# Patient Record
Sex: MALE | Race: AMERICAN INDIAN OR ALASKA NATIVE
[De-identification: names, ages, dates, MRNs, and addresses within clinical notes are randomized per-mention and may not be internally consistent; named-entity substitution may affect disease eponyms.]

---

## 2018-04-25 ENCOUNTER — HOSPITAL ENCOUNTER (EMERGENCY)
Dept: HOSPITAL 5 - ED | Age: 23
Discharge: HOME | End: 2018-04-25
Payer: COMMERCIAL

## 2018-04-25 VITALS — DIASTOLIC BLOOD PRESSURE: 66 MMHG | SYSTOLIC BLOOD PRESSURE: 119 MMHG

## 2018-04-25 DIAGNOSIS — F12.10: ICD-10-CM

## 2018-04-25 DIAGNOSIS — J45.31: Primary | ICD-10-CM

## 2018-04-25 DIAGNOSIS — F17.200: ICD-10-CM

## 2018-04-25 DIAGNOSIS — L20.84: ICD-10-CM

## 2018-04-25 PROCEDURE — 94644 CONT INHLJ TX 1ST HOUR: CPT

## 2018-04-25 NOTE — EMERGENCY DEPARTMENT REPORT
Blank Doc





- Documentation


Documentation: 





 is a 22-year-old  male who is presenting with asthma like 

symptoms for the past 2 days.  Patient ran out of his nebulizer medications.  

Patient does have seasonal allergy type symptoms.  Patient presenting with 

wheezing shortness of breath.


Patient has diffuse wheeze but is able to speak in full sentences.  Patie will 

be moved to a treatment room to get a nebulizer treatment.  Nt

## 2018-04-25 NOTE — EMERGENCY DEPARTMENT REPORT
HPI





- General


Chief Complaint: Adult Asthma


Time Seen by Provider: 04/25/18 13:01





- HPI


HPI: 





 Pt  is a 22-year-old  male who is presenting with asthma like 

symptoms for the past 2 days.  Patient ran out of his nebulizer medications.  

Patient does have seasonal allergy type symptoms.  Patient presenting with 

wheezing shortness of breath.  He denies any chest pain but reports tightness 

when he breathes in and this is similar with past asthma attacks..  He said he 

ran out of his albuterol nebulizer and also inhaler and would like refill.  

Denies any fever or chills.  Denies any nasal congestion or runny nose.  He 

said he had 1 ER visit over the last year without any issue of intubation.  

Chest tightness is 9 out of 10, feels tight and associated with breathing .  

She also reported that he has eczema flare up to his arms and would like to 

have eczema cream that does not have steroid and it because he said steroids 

make his skin light and thin.








ED Past Medical Hx





- Past Medical History


Previous Medical History?: Yes


Hx Asthma: Yes





- Surgical History


Past Surgical History?: No





- Family History


Family history: no significant





- Social History


Smoking Status: Current Every Day Smoker


Substance Use Type: Alcohol, Marijuana, Prescribed





- Medications


Home Medications: 


 Home Medications











 Medication  Instructions  Recorded  Confirmed  Last Taken  Type


 


ALBUTEROL Inhaler [ProAir HFA 2 puff IH Q4-6H PRN 1 Days #1 04/25/18  Unknown Rx





Inhaler] inhalation    


 


ALBUTEROL NEB's [Proventil 0.083% 2.5 mg IH Q6H PRN #1 pack 04/25/18  Unknown Rx





NEBS]     


 


Dimethicone/Colloidal Oatmeal 71 gm TP Q8H PRN #1 lotion 04/25/18  Unknown Rx





[Aveeno Daily Moisturizing Lot]     


 


Tacrolimus 30 gm TP BID #1 oint...g. 04/25/18  Unknown Rx


 


predniSONE [Deltasone] 20 mg PO QDAY 5 Days #5 tab 04/25/18  Unknown Rx














ED Review of Systems


ROS: 


Stated complaint: DOROTHY AFTER SMOKING


Other details as noted in HPI





Comment: All other systems reviewed and negative


Constitutional: no symptoms reported


ENT: denies: ear pain, throat pain, congestion


Respiratory: cough, shortness of breath, wheezing.  denies: orthopnea, SOB with 

exertion, SOB at rest, stridor


Cardiovascular: denies: chest pain, palpitations, edema, syncope


Gastrointestinal: denies: abdominal pain, nausea, vomiting, diarrhea, 

constipation


Musculoskeletal: denies: back pain, joint swelling, arthralgia, myalgia


Skin: denies: rash


Neurological: denies: headache, numbness, paresthesias, confusion, abnormal gait

, vertigo





Physical Exam





- Physical Exam


Vital Signs: 


 Vital Signs











  04/25/18





  12:53


 


Temperature 98.8 F


 


Pulse Rate 75


 


Respiratory 22





Rate 


 


Blood Pressure 119/66


 


O2 Sat by Pulse 95





Oximetry 











General: 





This 22-year-old male well-nourished well-developed that appears to be in mild 

distress from asthma exacerbation.  He is nontoxic in appearance


Physical Exam: 





Head: Normocephalic atraumatic


Ears:BIateral TM congested without erythema and loss of bony landmarks.  Macho 

EAC with normal exam.  No mastoid bone tenderness.


Mouth: Moist, no pharyngeal erythema or exudate .   No tonsillar erythema or 

exudate.  UVULA midline and oral airways patent. No peritonsillar abscess


Neck: Nontender to palpate, supple, normal range of motion. No adenopathy. No c-

spine tenderness.  


 Nose: Bilateral nasal mucosa congested with clear drainage.  Maxillary and 

frontal sinuses non-tender to palpate. 


 Eyes: Bilateral Sclerae and  conjunctiva without injection.  Bilateral pupils 

equal and reactive to light.  Bilateral lids are normal.    Normal 

accommodation.BEOMI


Lungs: Auscultated wheezing throughout lung fields.  No rhonchi or rale.  

Patient with dry cough . Mild increased work of breathing.No chest wall 

tenderness or crepitus


Extremity: No clubbing, cyanosis or edema.  Positive pulses all extremities and 

no neurovascular compromise


Abdomen: Soft, nontender to palpate in all quadrants and normal bowel sounds in 

all quadrants.


CV: S1, S2.  Regular rate and rhythm negative murmur.  Capillary refill is less 

than 3 seconds


Skin: Noted dry, scaly area to antecubital fossa appears to be eczema flare.  

No signs of infection


Psych: Normal mood and behavior 





ED Course


 Vital Signs











  04/25/18





  12:53


 


Temperature 98.8 F


 


Pulse Rate 75


 


Respiratory 22





Rate 


 


Blood Pressure 119/66


 


O2 Sat by Pulse 95





Oximetry 














- Reevaluation(s)


Reevaluation #1: 





04/25/18 15:09


Patient received albuterol 10 mg nebulizer, Atrovent 1 mg nebulizer and 

Deltasone 60 mg by mouth and upon reevaluation of lungs, patient lung sounds 

better with minimal wheeze in to upper lung fields.  He was refusing prednisone 

at first but decided to take.





ED Medical Decision Making





- Medical Decision Making





ED course: Pt with asthma flare and complains of eczema.  He was given 

albuterol 10 mg nebulizer and Atrovent 1 mg nebulizer.  He was also given 

prednisone 60 g by mouth which will help his eczema and also asthma flare.  

Upon reevaluation of lungs, patient lung sounds are better and he said he feels 

better.  Patient is requesting a cream for eczema that does not have steroid 

and it.  He is also refusing prescription for prednisone.  I discussed with him 

that he will need to take prednisone for asthma flare and he was very wheezy 

upon arrival to the emergency room so he accepted proximal prescription and 

also prescription for albuterol nebulizer and inhaler.  Patient does have 

access to primary care but he does not have primary care physician at this time 

so I will refer him to  [facility] and Dr. Erinne who is on-call 

for primary care.  Discharged from hospital in stable condition to follow up 

with primary care.


Critical care attestation.: 


If time is entered above; I have spent that time in minutes in the direct care 

of this critically ill patient, excluding procedure time.








ED Disposition


Clinical Impression: 


Eczema


Qualifiers:


 Eczema type: intrinsic Qualified Code(s): L20.84 - Intrinsic (allergic) eczema





Asthma attack


Qualifiers:


 Asthma severity: mild Asthma persistence: persistent Qualified Code(s): J45.31 

- Mild persistent asthma with (acute) exacerbation





Disposition: DC-01 TO HOME OR SELFCARE


Is pt being admited?: No


Does the pt Need Aspirin: No


Condition: Stable


Instructions:  Asthma (ED), Eczema (ED)


Additional Instructions: 


Please see medication as prescribed


Please see primary care referrals and discharge instruction paperwork


Prescriptions: 


ALBUTEROL Inhaler [ProAir HFA Inhaler] 2 puff IH Q4-6H PRN 1 Days #1 inhalation


 PRN Reason: cough/wheeezing


ALBUTEROL NEB's [Proventil 0.083% NEBS] 2.5 mg IH Q6H PRN #1 pack


 PRN Reason: cough and wheezing


Dimethicone/Colloidal Oatmeal [Aveeno Daily Moisturizing Lot] 71 gm TP Q8H PRN #

1 lotion


 PRN Reason: eczema


predniSONE [Deltasone] 20 mg PO QDAY 5 Days #5 tab


Tacrolimus 30 gm TP BID #1 oint...g.


Referrals: 


[facility] [Outside] - 04/27/18


ERINNE,SAMUEL C, MD [Staff Physician] - 04/27/18


LEO CLEVELAND MD [Staff Physician] - 04/30/18


Forms:  Work/School Release Form(ED)

## 2022-02-22 ENCOUNTER — WEB ENCOUNTER (OUTPATIENT)
Dept: URBAN - METROPOLITAN AREA CLINIC 17 | Facility: CLINIC | Age: 27
End: 2022-02-22

## 2022-02-22 ENCOUNTER — OFFICE VISIT (OUTPATIENT)
Dept: URBAN - METROPOLITAN AREA CLINIC 17 | Facility: CLINIC | Age: 27
End: 2022-02-22
Payer: COMMERCIAL

## 2022-02-22 ENCOUNTER — LAB OUTSIDE AN ENCOUNTER (OUTPATIENT)
Dept: URBAN - METROPOLITAN AREA CLINIC 17 | Facility: CLINIC | Age: 27
End: 2022-02-22

## 2022-02-22 DIAGNOSIS — E66.01 MORBID OBESITY: ICD-10-CM

## 2022-02-22 DIAGNOSIS — J45.20 MILD INTERMITTENT CHILDHOOD ASTHMA WITHOUT COMPLICATION: ICD-10-CM

## 2022-02-22 DIAGNOSIS — K59.09 CHRONIC CONSTIPATION: ICD-10-CM

## 2022-02-22 DIAGNOSIS — R10.33 PERIUMBILICAL ABDOMINAL PAIN: ICD-10-CM

## 2022-02-22 PROBLEM — 238136002: Status: ACTIVE | Noted: 2022-02-22

## 2022-02-22 PROCEDURE — 99204 OFFICE O/P NEW MOD 45 MIN: CPT | Performed by: INTERNAL MEDICINE

## 2022-02-22 NOTE — HPI-TODAY'S VISIT:
2/[birth year]/22 25 yo male who is self referred. No PCP. He is  with 3 children.  c/o epigastric/periumbilical pain for at least a year. There is a baseline almost constant discomfort with severe exacerbations. Baseline pain is mild. Severe exacerbations feel like "a knife in the stomach" No clear precipitants.  Usually worse in the am about 11 am. Lasts about 10 mins. No clear reason for resolution.  No nausea or vomiting.  Does not use nsaids or only rarely.  BMs are irregular. For the past year. He became vegan 2 months ago and this helped but then he stopped being vegan and BMs went back to baseline. No blood in stool.  He has a BM about 1-2 times a week. Has not tried any laxatives.

## 2022-02-23 LAB
A/G RATIO: 1.8
ALBUMIN: 4.6
ALKALINE PHOSPHATASE: 51
ALT (SGPT): 32
AST (SGOT): 28
BILIRUBIN, TOTAL: 0.3
BUN/CREATININE RATIO: 12
BUN: 11
CALCIUM: 9.4
CARBON DIOXIDE, TOTAL: 22
CHLORIDE: 103
CREATININE: 0.91
EGFR IF AFRICN AM: 135
EGFR IF NONAFRICN AM: 117
GLOBULIN, TOTAL: 2.5
GLUCOSE: 97
HEMATOCRIT: 48.9
HEMOGLOBIN: 16.3
MCH: 28.1
MCHC: 33.3
MCV: 84
NRBC: (no result)
PLATELETS: 287
POTASSIUM: 4
PROTEIN, TOTAL: 7.1
RBC: 5.81
RDW: 13.5
SODIUM: 140
WBC: 4.7

## 2022-02-28 ENCOUNTER — OFFICE VISIT (OUTPATIENT)
Dept: URBAN - METROPOLITAN AREA SURGERY CENTER 16 | Facility: SURGERY CENTER | Age: 27
End: 2022-02-28

## 2023-04-27 ENCOUNTER — TELEPHONE ENCOUNTER (OUTPATIENT)
Dept: URBAN - METROPOLITAN AREA CLINIC 17 | Facility: CLINIC | Age: 28
End: 2023-04-27

## 2023-07-17 ENCOUNTER — OFFICE VISIT (OUTPATIENT)
Dept: URBAN - METROPOLITAN AREA CLINIC 17 | Facility: CLINIC | Age: 28
End: 2023-07-17

## 2023-07-27 ENCOUNTER — OFFICE VISIT (OUTPATIENT)
Dept: URBAN - METROPOLITAN AREA CLINIC 17 | Facility: CLINIC | Age: 28
End: 2023-07-27

## 2023-10-05 ENCOUNTER — OFFICE VISIT (OUTPATIENT)
Dept: URBAN - METROPOLITAN AREA CLINIC 17 | Facility: CLINIC | Age: 28
End: 2023-10-05

## 2023-10-23 ENCOUNTER — OFFICE VISIT (OUTPATIENT)
Dept: URBAN - METROPOLITAN AREA CLINIC 17 | Facility: CLINIC | Age: 28
End: 2023-10-23
Payer: COMMERCIAL

## 2023-10-23 ENCOUNTER — LAB OUTSIDE AN ENCOUNTER (OUTPATIENT)
Dept: URBAN - METROPOLITAN AREA CLINIC 17 | Facility: CLINIC | Age: 28
End: 2023-10-23

## 2023-10-23 VITALS
BODY MASS INDEX: 44.1 KG/M2 | WEIGHT: 315 LBS | DIASTOLIC BLOOD PRESSURE: 77 MMHG | SYSTOLIC BLOOD PRESSURE: 114 MMHG | HEART RATE: 76 BPM | TEMPERATURE: 97.6 F | HEIGHT: 71 IN

## 2023-10-23 DIAGNOSIS — J45.20 MILD INTERMITTENT CHILDHOOD ASTHMA WITHOUT COMPLICATION: ICD-10-CM

## 2023-10-23 DIAGNOSIS — R10.33 PERIUMBILICAL ABDOMINAL PAIN: ICD-10-CM

## 2023-10-23 DIAGNOSIS — E66.01 MORBID OBESITY: ICD-10-CM

## 2023-10-23 DIAGNOSIS — K59.09 CHRONIC CONSTIPATION: ICD-10-CM

## 2023-10-23 PROCEDURE — 99214 OFFICE O/P EST MOD 30 MIN: CPT | Performed by: INTERNAL MEDICINE
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
## 2023-11-01 ENCOUNTER — OFFICE VISIT (OUTPATIENT)
Dept: URBAN - METROPOLITAN AREA SURGERY CENTER 16 | Facility: SURGERY CENTER | Age: 28
End: 2023-11-01

## 2024-01-17 ENCOUNTER — TELEPHONE ENCOUNTER (OUTPATIENT)
Dept: URBAN - METROPOLITAN AREA CLINIC 105 | Facility: CLINIC | Age: 29
End: 2024-01-17

## 2024-01-17 RX ORDER — PANTOPRAZOLE SODIUM 40 MG/1
1 TABLET TABLET, DELAYED RELEASE ORAL ONCE A DAY
Qty: 90 TABLET | Refills: 0 | OUTPATIENT
Start: 2024-01-17

## 2024-01-18 ENCOUNTER — OFFICE VISIT (OUTPATIENT)
Dept: URBAN - METROPOLITAN AREA SURGERY CENTER 16 | Facility: SURGERY CENTER | Age: 29
End: 2024-01-18

## 2024-02-29 ENCOUNTER — OV EP (OUTPATIENT)
Dept: URBAN - METROPOLITAN AREA CLINIC 17 | Facility: CLINIC | Age: 29
End: 2024-02-29

## 2024-02-29 RX ORDER — PANTOPRAZOLE SODIUM 40 MG/1
1 TABLET TABLET, DELAYED RELEASE ORAL ONCE A DAY
Qty: 90 TABLET | Refills: 0 | COMMUNITY
Start: 2024-01-17